# Patient Record
Sex: FEMALE | Race: OTHER
[De-identification: names, ages, dates, MRNs, and addresses within clinical notes are randomized per-mention and may not be internally consistent; named-entity substitution may affect disease eponyms.]

---

## 2019-04-01 ENCOUNTER — HOSPITAL ENCOUNTER (EMERGENCY)
Dept: HOSPITAL 62 - ER | Age: 49
LOS: 1 days | Discharge: HOME | End: 2019-04-02
Payer: OTHER GOVERNMENT

## 2019-04-01 DIAGNOSIS — N93.8: ICD-10-CM

## 2019-04-01 DIAGNOSIS — R11.0: ICD-10-CM

## 2019-04-01 DIAGNOSIS — D25.9: ICD-10-CM

## 2019-04-01 DIAGNOSIS — R10.30: Primary | ICD-10-CM

## 2019-04-01 DIAGNOSIS — R14.0: ICD-10-CM

## 2019-04-01 PROCEDURE — 74177 CT ABD & PELVIS W/CONTRAST: CPT

## 2019-04-01 PROCEDURE — 87210 SMEAR WET MOUNT SALINE/INK: CPT

## 2019-04-01 PROCEDURE — 87491 CHLMYD TRACH DNA AMP PROBE: CPT

## 2019-04-01 PROCEDURE — 99284 EMERGENCY DEPT VISIT MOD MDM: CPT

## 2019-04-01 PROCEDURE — 80053 COMPREHEN METABOLIC PANEL: CPT

## 2019-04-01 PROCEDURE — 96374 THER/PROPH/DIAG INJ IV PUSH: CPT

## 2019-04-01 PROCEDURE — 96372 THER/PROPH/DIAG INJ SC/IM: CPT

## 2019-04-01 PROCEDURE — 76830 TRANSVAGINAL US NON-OB: CPT

## 2019-04-01 PROCEDURE — 96375 TX/PRO/DX INJ NEW DRUG ADDON: CPT

## 2019-04-01 PROCEDURE — 87591 N.GONORRHOEAE DNA AMP PROB: CPT

## 2019-04-01 PROCEDURE — 81025 URINE PREGNANCY TEST: CPT

## 2019-04-01 PROCEDURE — 81001 URINALYSIS AUTO W/SCOPE: CPT

## 2019-04-01 PROCEDURE — 85025 COMPLETE CBC W/AUTO DIFF WBC: CPT

## 2019-04-01 PROCEDURE — 93976 VASCULAR STUDY: CPT

## 2019-04-01 PROCEDURE — 36415 COLL VENOUS BLD VENIPUNCTURE: CPT

## 2019-04-02 VITALS — DIASTOLIC BLOOD PRESSURE: 68 MMHG | SYSTOLIC BLOOD PRESSURE: 119 MMHG

## 2019-04-02 LAB
ADD MANUAL DIFF: NO
ALBUMIN SERPL-MCNC: 3.9 G/DL (ref 3.5–5)
ALP SERPL-CCNC: 87 U/L (ref 38–126)
ALT SERPL-CCNC: 21 U/L (ref 9–52)
ANION GAP SERPL CALC-SCNC: 7 MMOL/L (ref 5–19)
APPEARANCE UR: (no result)
APTT PPP: YELLOW S
AST SERPL-CCNC: 18 U/L (ref 14–36)
BACTERIA (WET MOUNT): (no result)
BASOPHILS # BLD AUTO: 0 10^3/UL (ref 0–0.2)
BASOPHILS NFR BLD AUTO: 0.5 % (ref 0–2)
BILIRUB DIRECT SERPL-MCNC: 0.2 MG/DL (ref 0–0.4)
BILIRUB SERPL-MCNC: 0.7 MG/DL (ref 0.2–1.3)
BILIRUB UR QL STRIP: NEGATIVE
BUN SERPL-MCNC: 12 MG/DL (ref 7–20)
CALCIUM: 9.3 MG/DL (ref 8.4–10.2)
CHLAM PCR: NOT DETECTED
CHLORIDE SERPL-SCNC: 109 MMOL/L (ref 98–107)
CO2 SERPL-SCNC: 25 MMOL/L (ref 22–30)
EOSINOPHIL # BLD AUTO: 0.1 10^3/UL (ref 0–0.6)
EOSINOPHIL NFR BLD AUTO: 1.1 % (ref 0–6)
EPITHELIALS (WET MOUNT): (no result)
ERYTHROCYTE [DISTWIDTH] IN BLOOD BY AUTOMATED COUNT: 13.9 % (ref 11.5–14)
GLUCOSE SERPL-MCNC: 99 MG/DL (ref 75–110)
GLUCOSE UR STRIP-MCNC: NEGATIVE MG/DL
GON PCR: NOT DETECTED
HCT VFR BLD CALC: 35.7 % (ref 36–47)
HGB BLD-MCNC: 12.2 G/DL (ref 12–15.5)
KETONES UR STRIP-MCNC: NEGATIVE MG/DL
LYMPHOCYTES # BLD AUTO: 3 10^3/UL (ref 0.5–4.7)
LYMPHOCYTES NFR BLD AUTO: 46.7 % (ref 13–45)
MCH RBC QN AUTO: 29.4 PG (ref 27–33.4)
MCHC RBC AUTO-ENTMCNC: 34.2 G/DL (ref 32–36)
MCV RBC AUTO: 86 FL (ref 80–97)
MONOCYTES # BLD AUTO: 0.4 10^3/UL (ref 0.1–1.4)
MONOCYTES NFR BLD AUTO: 5.6 % (ref 3–13)
NEUTROPHILS # BLD AUTO: 2.9 10^3/UL (ref 1.7–8.2)
NEUTS SEG NFR BLD AUTO: 46.1 % (ref 42–78)
NITRITE UR QL STRIP: NEGATIVE
PH UR STRIP: 6 [PH] (ref 5–9)
PLATELET # BLD: 268 10^3/UL (ref 150–450)
POTASSIUM SERPL-SCNC: 3.4 MMOL/L (ref 3.6–5)
PROT SERPL-MCNC: 6.6 G/DL (ref 6.3–8.2)
PROT UR STRIP-MCNC: NEGATIVE MG/DL
RBC # BLD AUTO: 4.15 10^6/UL (ref 3.72–5.28)
RBCS (WET MOUNT): (no result)
SODIUM SERPL-SCNC: 140.5 MMOL/L (ref 137–145)
SP GR UR STRIP: 1.03
T.VAGINALIS (WET MOUNT): (no result)
TOTAL CELLS COUNTED % (AUTO): 100 %
UROBILINOGEN UR-MCNC: NEGATIVE MG/DL (ref ?–2)
WBC # BLD AUTO: 6.4 10^3/UL (ref 4–10.5)
WBCS (WET MOUNT): (no result)
YEAST (WET MOUNT): (no result)

## 2019-04-02 NOTE — ER DOCUMENT REPORT
ED General





- General


Chief Complaint: Abdominal Pain


Stated Complaint: ABDOMINAL PAIN


Time Seen by Provider: 04/01/19 23:52


Primary Care Provider: 


NUSRAT BORDEN PA-C [Primary Care Provider] - Follow up tomorrow


Notes: 





Patient is a 48-year-old female with no chronic medical problems, no prior 

abdominal surgical history, presents with 2-3 days of lower abdominal discomfort

and bloating that became much worse in the last 12 hours.  Patient describes the

pain as being a severe, cramping, bloating discomfort.  She states that the pain

has been gradually worsening although became abruptly much worse when getting on

a flight from [place] some back to home today.  Nothing seems to improve the 

discomfort and she has tried ibuprofen.  Nothing worsens the discomfort.  The 

patient denies fever or constitutional symptoms.  Has had vaginal bleeding but 

denies vaginal discharge or dysuria.  Since her last cycle was over 3 months ago

which is highly atypical for her and this is the first cycle she has had since 

that time.  She has not seen her general physician or OB/GYN regarding today's 

concerns.  She denies a history of similar symptoms in the past.


TRAVEL OUTSIDE OF THE [place] IN LAST 30 DAYS: No





- Related Data


Allergies/Adverse Reactions: 


                                        





No Known Allergies Allergy (Unverified 04/01/19 21:53)


   











Past Medical History





- General


Information source: Patient





- Social History


Smoking Status: Never Smoker


Frequency of alcohol use: None


Drug Abuse: None


Lives with: Spouse/Significant other


Family History: Reviewed & Not Pertinent


Patient has suicidal ideation: No


Patient has homicidal ideation: No


Renal/ Medical History: Denies: Hx Peritoneal Dialysis





Review of Systems





- Review of Systems


Notes: 





Constitutional: Negative for fever.


HENT: Negative for sore throat.


Eyes: Negative for visual changes.


Cardiovascular: Negative for chest pain.


Respiratory: Negative for shortness of breath.


Gastrointestinal: Positive for bloating and lower abdominal pain.  Positive for 

nausea.


Genitourinary: Positive for vaginal bleeding


Musculoskeletal: Negative for back pain.


Skin: Negative for rash.


Neurological: Negative for headaches, weakness or numbness.





10 point ROS negative except as marked above and in HPI.





Physical Exam





- Vital signs


Vitals: 


                                        











Temp Pulse Resp BP Pulse Ox


 


 98.4 F   68   16   178/82 H  98 


 


 04/01/19 22:25  04/01/19 22:25  04/01/19 22:25  04/01/19 22:25  04/01/19 22:25











Interpretation: Hypertensive


Notes: 





PHYSICAL EXAMINATION:





GENERAL: Well-appearing, well-nourished and in no acute distress.





HEAD: Atraumatic, normocephalic.





EYES: Pupils equal round and reactive to light, extraocular movements intact, 

sclera anicteric, conjunctiva are normal.





ENT: nares patent, oropharynx clear without exudates.  Moist mucous membranes.





NECK: Normal range of motion, supple without lymphadenopathy





LUNGS: Breath sounds clear to auscultation bilaterally and equal.  No wheezes 

rales or rhonchi.





HEART: Regular rate and rhythm without murmurs





ABDOMEN: Soft, mildly bloated abdomen, mild suprapubic abdominal tenderness but 

no other areas of localized tenderness, normoactive bowel sounds.  No guarding, 

no rebound.  No masses appreciated.





: Scant bleeding from the cervical office.  No cervical lesions.  No cervical 

motion tenderness.  No adnexal tenderness.  Mild subpubic tenderness to 

palpation.





EXTREMITIES: Normal range of motion, no pitting or edema.  No cyanosis.





NEUROLOGICAL: No focal neurological deficits. Moves all extremities spont

aneously and on command.





PSYCH: Normal mood, normal affect.





SKIN: Warm, Dry, normal turgor, no rashes or lesions noted.





Course





- Re-evaluation


Re-evalutation: 





04/02/19 06:42


Presentation of a well-appearing 48-year-old female with abdominal bleeding and 

lower abdominal cramping that is been ongoing for the past 2 days much worse in 

the last 12 hours.  Abdominal exam is benign without any focal tenderness.  

Vitals are normal at the time of arrival.  Laboratories are unremarkable without

evidence of cystitis, pregnancy, or leukocytosis.    Based on clinical history 

and examination I do not suspect an acute appendicitis, tubo-ovarian abscess, 

pregnancy related pathology, pelvic inflammatory disease, mesenteric ischemia, 

or pyelonephritis.  Pelvic exam without cervical motion tenderness or focal 

adnexal tenderness.  CT scan of the abdomen pelvis likewise unremarkable.  

Transvaginal ultrasound likewise normal with the exception of 2 fibroids which 

could be worsening the patient's pain.  At this time will discharge with return 

precautions and follow-up recommendations.  Verbal discharge instructions given 

a the bedside and opportunity for questions given. Medication warnings reviewed.

Patient is in agreement with this plan and has verbalized understanding of 

return precautions and the need for primary care follow-up in the next 24-72 

hours.


04/02/19 07:27








- Vital Signs


Vital signs: 


                                        











Temp Pulse Resp BP Pulse Ox


 


 98.4 F   68   16   178/82 H  97 


 


 04/01/19 22:25  04/01/19 22:25  04/02/19 07:00  04/01/19 22:25  04/02/19 07:00














- Laboratory


Result Diagrams: 


                                 04/02/19 01:14





                                 04/02/19 01:58


Laboratory results interpreted by me: 


                                        











  04/02/19 04/02/19 04/02/19





  01:14 01:58 03:11


 


Hct  35.7 L  


 


Lymphocytes %  46.7 H  


 


Potassium   3.4 L 


 


Chloride   109 H 


 


Urine Blood    LARGE H














- Diagnostic Test


Radiology reviewed: Reports reviewed





Discharge





- Discharge


Clinical Impression: 


 Lower abdominal pain, Abdominal bloating associated with menstruation, Vaginal 

bleeding





Uterine fibroid


Qualifiers:


 Uterine leiomyoma location: unspecified location Qualified Code(s): D25.9 - 

Leiomyoma of uterus, unspecified





Condition: Good


Disposition: HOME, SELF-CARE


Additional Instructions: 


You have been seen in the Emergency Department (ED) for abdominal pain.  Your 

evaluation did not identify a clear cause of your symptoms but was generally 

reassuring.  Your CT scan, labs, ultrasound and pelvic exam are all reassuring. 

I do suspect that your symptoms are related to your menstrual cycle particular 

given that the cycle was delayed.





For your pain: Take ibuprofen 600 mg and acetaminophen 1000 mg every 6 hours 

together as needed for pain. 





Please follow up with your doctor as soon as possible regarding today's emergent

visit and the symptoms that are bothering you.





Return to the ED if your abdominal pain worsens or fails to improve, you develop

bloody vomiting, bloody diarrhea, you are unable to tolerate fluids due to 

vomiting, fever greater than 101, or other symptoms that concern you.


Prescriptions: 


Tramadol HCl [Ultram 50 mg Tablet] 50 mg PO Q6HP PRN #10 tablet


 PRN Reason: 


Referrals: 


NUSRAT BORDEN PA-C [Primary Care Provider] - Follow up tomorrow


BREE LUCAS MD [ACTIVE STAFF] - Follow up in 3-5 days

## 2019-04-02 NOTE — RADIOLOGY REPORT (SQ)
EXAM DESCRIPTION: 



US TRANSVAGINAL



COMPLETED DATE/TME:  04/02/2019 04:29



CLINICAL HISTORY: 48 years Female, lower pelvic pain, bleeding.

LMP 3/29/2019



COMPARISON: None.



TECHNIQUE: Complete pelvic ultrasound with transvaginal imaging.



FINDINGS:



Uterus: Uterus measures 7.4 x 6.4 x 5.9. Heterogeneous hypodense

structures within the uterine myometrium. There is an posterior

intramural structure measuring 1.5 cm in greatest dimension.

There is a posterior subserosal structure measuring 2.6 cm in

greatest dimension. Cervix measures 3.3 cm. Nabothian cysts.

Endometrium: Endometrial thickness of 0.3 cm.



Right ovary: Right ovary measures 3.0 x 1.2 x 1.9 cm.

Left ovary: Left ovary measures 2.7 x 1.4 x 1.9 cm.

Adnexa: No large adnexal masses.



Free fluid: No free pelvic fluid.



Duplex imaging: Color and spectral Doppler imaging of the ovaries

demonstrates flow bilaterally.



IMPRESSION:



1. There are 2 fibroids within the uterus, the largest is a

subserosal posterior fibroid measuring 2.6 cm in greatest

dimension.



2. No sonographic abnormality identified in the pelvis.

## 2019-04-02 NOTE — RADIOLOGY REPORT (SQ)
EXAM DESCRIPTION: 



CT ABDOMEN PELVIS WITH IV CONTRAST



COMPLETED DATE/TME:  04/02/2019 00:05



CLINICAL HISTORY: 



48 years, Female, severe abd pain



COMPARISON:

None.



TECHNIQUE:

397  Images stored on PACS.

 

All CT scanners at this facility use dose modulation, iterative

reconstruction, and/or weight based dosing when appropriate to

reduce radiation dose to as low as reasonably achievable (ALARA).





[facility]: Dose Right [facility]: CareDose   [facility]: Dose Right    [facility]:

Teradose 4D    [facility]: Smart Technologies



LIMITATIONS:

None.



FINDINGS:



Limited evaluation of the lung bases is unremarkable. Osseous

structures are grossly intact. Fatty infiltrative change to the

liver. The spleen, adrenal glands, pancreas, kidneys are

unremarkable. The gallbladder is present. Normal appendix. No

gross evidence for bowel obstruction. No free air or free fluid.





IMPRESSION:



Negative for acute intra-abdominal/pelvic process

 

TECHNICAL DOCUMENTATION:



Quality ID # 436: Final reports with documentation of one or more

dose reduction techniques (e.g., Automated exposure control,

adjustment of the mA and/or kV according to patient size, use of

iterative reconstruction technique)



copyright 2011 [employer]- All Rights Reserved

## 2019-04-02 NOTE — ER DOCUMENT REPORT
ED Medical Screen (RME)





- General


Chief Complaint: Abdominal Pain


Stated Complaint: ABDOMINAL PAIN


Time Seen by Provider: 04/01/19 23:52


Notes: 





48F with no past medical history presents to the emergency department for severe

periumbilical abdominal pain since 230 this afternoon.  She was boarding a 

flight leaving [place] when the pain started but she stated that she wanted to 

get home before she got seen.  She states the pain is constant and unremitting 

rated 10/10.  She states she is also very bloated and her  notes it is 

gotten worse since they boarded the flight.  She denies fevers, chills, nausea, 

vomiting, diarrhea.


TRAVEL OUTSIDE OF THE [place] IN LAST 30 DAYS: No





- Related Data


Allergies/Adverse Reactions: 


                                        





No Known Allergies Allergy (Unverified 04/01/19 21:53)


   











Past Medical History


Renal/ Medical History: Denies: Hx Peritoneal Dialysis





Physical Exam





- Vital signs


Vitals: 





                                        











Temp Pulse Resp BP Pulse Ox


 


 98.4 F   68   16   178/82 H  98 


 


 04/01/19 22:25  04/01/19 22:25  04/01/19 22:25  04/01/19 22:25  04/01/19 22:25














- Respiratory


Respiratory status: No respiratory distress


Breath sounds: Normal





- Cardiovascular


Rhythm: Regular


Heart sounds: Normal auscultation, S1 appreciated, S2 appreciated


Murmur: No





Course





- Vital Signs


Vital signs: 





                                        











Temp Pulse Resp BP Pulse Ox


 


 98.4 F   68   16   178/82 H  98 


 


 04/01/19 22:25  04/01/19 22:25  04/01/19 22:25  04/01/19 22:25  04/01/19 22:25

## 2020-04-30 ENCOUNTER — HOSPITAL ENCOUNTER (OUTPATIENT)
Dept: HOSPITAL 62 - RDC | Age: 50
End: 2020-04-30
Attending: NURSE PRACTITIONER
Payer: COMMERCIAL

## 2020-04-30 VITALS — DIASTOLIC BLOOD PRESSURE: 86 MMHG | SYSTOLIC BLOOD PRESSURE: 130 MMHG

## 2020-04-30 DIAGNOSIS — R05: ICD-10-CM

## 2020-04-30 DIAGNOSIS — R09.89: ICD-10-CM

## 2020-04-30 DIAGNOSIS — R51: ICD-10-CM

## 2020-04-30 DIAGNOSIS — M79.10: ICD-10-CM

## 2020-04-30 DIAGNOSIS — Z20.828: Primary | ICD-10-CM

## 2020-04-30 DIAGNOSIS — R06.02: ICD-10-CM

## 2020-04-30 DIAGNOSIS — J02.9: ICD-10-CM

## 2020-04-30 DIAGNOSIS — R10.9: ICD-10-CM

## 2020-04-30 DIAGNOSIS — R50.9: ICD-10-CM

## 2020-04-30 LAB
A TYPE INFLUENZA AG: NEGATIVE
B INFLUENZA AG: NEGATIVE

## 2020-04-30 PROCEDURE — 87070 CULTURE OTHR SPECIMN AEROBIC: CPT

## 2020-04-30 PROCEDURE — 87880 STREP A ASSAY W/OPTIC: CPT

## 2020-04-30 PROCEDURE — 99211 OFF/OP EST MAY X REQ PHY/QHP: CPT

## 2020-04-30 PROCEDURE — 87635 SARS-COV-2 COVID-19 AMP PRB: CPT

## 2020-04-30 PROCEDURE — 87804 INFLUENZA ASSAY W/OPTIC: CPT

## 2020-04-30 PROCEDURE — 99201: CPT

## 2020-04-30 NOTE — ER RDC ASSESSMENT REPORT
Intake





- In the Last 14 days


Have you traveled outside [place]?: No


Have you been in close contact with someone CONFIRMED: No


Worked in Healthcare?: No





- Symptoms


Subjective Fever(Felt feverish): Yes


Chills: Yes


Muscule Aches: Yes


Runny Nose: Yes


Sore Throat: Yes


Cough (New or worsening chronic cough): Yes


--How many day(s)?: RePorts a dry cough has been treated for bronchitis


Shortness of breath: Yes


Nausea or Vomiting: No


Headache: Yes


Abdominal Pain: Yes


Diarrhea(3 or more loose stools in last 24 hours): No





- Do you have any of the following


Chronic lung disease: Asthma or emphysema or COPD: No


Cystic Fibrosis: No


Diabetes: No


High Blood Pressure: No


Cardiovascular Disease: No


Chronic Kidney Disease: No


Chronic Liver Disease: No


Chronic blood disorder like Sickle Cell Disease: No


Weak immune system due to disease or medication: No


Neurologic condition that limits movement: No


Developmental delay - Moderate to Severe: No


Recent (within past 2 weeks) or current Pregnancy: No


Morbid Obesity (>100 pounds over ideal weight): No


Obesity Comment: 





Height 5 feet 3 inches weight 161 pounds





- Objective


Temperature: 96.2 F


Pulse Rate: 86


Respiratory Rate: 20


Blood Pressure: 130/86


O2 Sat by Pulse Oximetry: 98


Objective: 


Given above, testing performed: 
































If Testing Performed:


Test Specimen Type Sent to











General





- General


Information source: Patient


Notes: 





Patient here for Covid testing. Has been feeling sick since April 7, 2020 just 

completed Antibiotics for Bronchitis. Was also given an inhaler. Reports has had

fever on and off. Continues to have the same symptoms with cough not improved.  

Will be following up with PCP today.





- Related Data


Allergies/Adverse Reactions: 


                                        





No Known Allergies Allergy (Unverified 04/01/19 21:53)


   











Past Medical History





- Social History


Smoking Status: Never Smoker


Family History: Reviewed & Not Pertinent


Renal/ Medical History: Denies: Hx Peritoneal Dialysis





Physical Exam





- General


General appearance: Appears well, Alert


In distress: None


Notes: 





PHYSICAL EXAMINATION: 


GENERAL: Well-appearing and in no acute distress. 


HEAD: Atraumatic, normocephalic. 


EYES: sclera anicteric, conjunctiva are normal. 


ENT: nares patent. Moist mucous membranes. 


NECK: Normal range of motion, supple without lymphadenopathy slight right 

anterior lymphadenopothy tender bilateral. States throat sore both sides.


LUNGS: CTAB and equal. No wheezes rales or rhonchi. Lung sounds clear resp even 

and unlabored.


HEART: Regular rate and rhythm without murmurs 


ABDOMEN: Soft, nontender, normal bowel sounds, no guarding. 


EXTREMITIES: No cyanosis. 


NEUROLOGICAL:  Normal speech. 


PSYCH: Normal mood, normal affect. 


SKIN: Warm, Dry, normal turgor, 








- Respiratory


Breath sounds: Normal





Diagnostic Results


Laboratory Results: 


 Patient informed of negative rapid strep and negative rapid flu. Pending strep 

culture pending covid testing results. Provided instructions on COVID to 

include: As a person under investigation for Covid 19, the [facility] of Health and Human Services, division of public health advises you 

to adhere to the following guidance until your test results are reported to you.

 If your test result is positive, you will receive additional information from 

your provider and your local health department at that time.


 


Remain at home until you are cleared by the health provider or public health 

authorities.


 


Keep a log of visitors to your home, notify any visitors to your home of your 

isolation status.


 


If you plan to move to a new address or leave the county, notify the local 

health department in your County.


 


Call your doctor or seek care if you have an urgent medical need.  Before 

seeking medical care, call ahead to get instructions from the provider before 

arriving at the medical office clinic or hospital.  Notify them that you are 

being tested for the virus that causes Covid 19 so that arrangements can be 

made, as necessary, to prevent transmission to others in the healthcare setting.

 Next, notify the local health department in your county.


 


If a medical emergency arises and you need to call 911, inform the first 

responders that you are being tested for the virus that causes Covid 19.  Next, 

notify the local health department in your county.





Patient Education/Counseling


Counseling/Education: 





Patient presents with upper respiratory symptoms worrisome for possible Covid 

19.  Patient does not have emergency worring symptoms such as difficulty 

breathing, shortness of breath, chest pain, pressure, confusion or cyanosis.  

Patient appears suitable for discharge. Patient scheduled to follow up with PCP 

today.  Instructed to go to ED for persistent or worsening symptoms.  Patient's 

vital signs are stable and patient is nontoxic in appearance.  Good return 

precautions have been discussed with patient, patient verbalized understanding 

and is agreeable with discharge plan of care at this time.





C Discharge





- Discharge


Clinical Impression: 


 COVID --19 SCREENING





Condition: Stable


Disposition: Home; Selfcare

## 2020-05-08 ENCOUNTER — HOSPITAL ENCOUNTER (OUTPATIENT)
Dept: HOSPITAL 62 - RAD | Age: 50
End: 2020-05-08
Attending: PHYSICIAN ASSISTANT
Payer: COMMERCIAL

## 2020-05-08 DIAGNOSIS — R05: Primary | ICD-10-CM

## 2020-05-08 PROCEDURE — 71046 X-RAY EXAM CHEST 2 VIEWS: CPT

## 2020-05-08 NOTE — RADIOLOGY REPORT (SQ)
EXAM DESCRIPTION:  CHEST 2 VIEWS



IMAGES COMPLETED DATE/TIME:  5/8/2020 10:21 am



REASON FOR STUDY:  R05 COUGH



COMPARISON:  None.



EXAM PARAMETERS:  NUMBER OF VIEWS: two views

TECHNIQUE: Digital Frontal and Lateral radiographic views of the chest acquired.

RADIATION DOSE: NA

LIMITATIONS: none



FINDINGS:  LUNGS AND PLEURA: No opacities, masses or pneumothorax. No pleural effusion.

MEDIASTINUM AND HILAR STRUCTURES: No masses or contour abnormalities.

HEART AND VASCULAR STRUCTURES: Heart normal size.  No evidence for failure.

BONES: No acute findings.

HARDWARE: None in the chest.

OTHER: No other significant finding.



IMPRESSION:  NO ACUTE RADIOGRAPHIC FINDING IN THE CHEST.



TECHNICAL DOCUMENTATION:  JOB ID:  [id number]

 2011 [employer]- All Rights Reserved



Reading location - IP/workstation name: [email or web address]

## 2020-06-23 ENCOUNTER — HOSPITAL ENCOUNTER (EMERGENCY)
Dept: HOSPITAL 62 - ER | Age: 50
Discharge: HOME | End: 2020-06-23
Payer: COMMERCIAL

## 2020-06-23 VITALS — SYSTOLIC BLOOD PRESSURE: 132 MMHG | DIASTOLIC BLOOD PRESSURE: 72 MMHG

## 2020-06-23 DIAGNOSIS — M54.5: ICD-10-CM

## 2020-06-23 DIAGNOSIS — R31.9: ICD-10-CM

## 2020-06-23 DIAGNOSIS — D25.9: Primary | ICD-10-CM

## 2020-06-23 LAB
ADD MANUAL DIFF: NO
ALBUMIN SERPL-MCNC: 4.3 G/DL (ref 3.5–5)
ALP SERPL-CCNC: 102 U/L (ref 38–126)
ANION GAP SERPL CALC-SCNC: 3 MMOL/L (ref 5–19)
APPEARANCE UR: CLEAR
APTT PPP: (no result) S
AST SERPL-CCNC: 29 U/L (ref 14–36)
BASOPHILS # BLD AUTO: 0 10^3/UL (ref 0–0.2)
BASOPHILS NFR BLD AUTO: 0.3 % (ref 0–2)
BILIRUB DIRECT SERPL-MCNC: 0 MG/DL (ref 0–0.4)
BILIRUB SERPL-MCNC: 0.4 MG/DL (ref 0.2–1.3)
BILIRUB UR QL STRIP: NEGATIVE
BUN SERPL-MCNC: 10 MG/DL (ref 7–20)
CALCIUM: 9.1 MG/DL (ref 8.4–10.2)
CHLORIDE SERPL-SCNC: 107 MMOL/L (ref 98–107)
CO2 SERPL-SCNC: 28 MMOL/L (ref 22–30)
EOSINOPHIL # BLD AUTO: 0 10^3/UL (ref 0–0.6)
EOSINOPHIL NFR BLD AUTO: 0.8 % (ref 0–6)
ERYTHROCYTE [DISTWIDTH] IN BLOOD BY AUTOMATED COUNT: 13.8 % (ref 11.5–14)
GLUCOSE SERPL-MCNC: 96 MG/DL (ref 75–110)
GLUCOSE UR STRIP-MCNC: NEGATIVE MG/DL
HCT VFR BLD CALC: 37.3 % (ref 36–47)
HGB BLD-MCNC: 12.8 G/DL (ref 12–15.5)
KETONES UR STRIP-MCNC: NEGATIVE MG/DL
LYMPHOCYTES # BLD AUTO: 1.8 10^3/UL (ref 0.5–4.7)
LYMPHOCYTES NFR BLD AUTO: 33.7 % (ref 13–45)
MCH RBC QN AUTO: 29.9 PG (ref 27–33.4)
MCHC RBC AUTO-ENTMCNC: 34.2 G/DL (ref 32–36)
MCV RBC AUTO: 87 FL (ref 80–97)
MONOCYTES # BLD AUTO: 0.3 10^3/UL (ref 0.1–1.4)
MONOCYTES NFR BLD AUTO: 5.5 % (ref 3–13)
NEUTROPHILS # BLD AUTO: 3.2 10^3/UL (ref 1.7–8.2)
NEUTS SEG NFR BLD AUTO: 59.7 % (ref 42–78)
NITRITE UR QL STRIP: NEGATIVE
PH UR STRIP: 7 [PH] (ref 5–9)
PLATELET # BLD: 259 10^3/UL (ref 150–450)
POTASSIUM SERPL-SCNC: 4.2 MMOL/L (ref 3.6–5)
PROT SERPL-MCNC: 7.1 G/DL (ref 6.3–8.2)
PROT UR STRIP-MCNC: NEGATIVE MG/DL
RBC # BLD AUTO: 4.27 10^6/UL (ref 3.72–5.28)
SP GR UR STRIP: 1
TOTAL CELLS COUNTED % (AUTO): 100 %
UROBILINOGEN UR-MCNC: NEGATIVE MG/DL (ref ?–2)
WBC # BLD AUTO: 5.3 10^3/UL (ref 4–10.5)

## 2020-06-23 PROCEDURE — 76830 TRANSVAGINAL US NON-OB: CPT

## 2020-06-23 PROCEDURE — 81001 URINALYSIS AUTO W/SCOPE: CPT

## 2020-06-23 PROCEDURE — 96372 THER/PROPH/DIAG INJ SC/IM: CPT

## 2020-06-23 PROCEDURE — 99284 EMERGENCY DEPT VISIT MOD MDM: CPT

## 2020-06-23 PROCEDURE — 85025 COMPLETE CBC W/AUTO DIFF WBC: CPT

## 2020-06-23 PROCEDURE — 80053 COMPREHEN METABOLIC PANEL: CPT

## 2020-06-23 PROCEDURE — 74176 CT ABD & PELVIS W/O CONTRAST: CPT

## 2020-06-23 PROCEDURE — 36415 COLL VENOUS BLD VENIPUNCTURE: CPT

## 2020-06-23 NOTE — RADIOLOGY REPORT (SQ)
EXAM DESCRIPTION:  CT ABD/PELVIS NO ORAL OR IV



IMAGES COMPLETED DATE/TIME:  6/23/2020 11:04 am



REASON FOR STUDY:  low back pain/blood in urine



COMPARISON:  CT of the abdomen and pelvis with contrast from 4/2/2019.



TECHNIQUE:  CT scan of the abdomen and pelvis performed without intravenous or oral contrast. Images 
reviewed with lung, soft tissue, and bone windows. Reconstructed coronal and sagittal MPR images revi
ewed. All images stored on PACS.

All CT scanners at this facility use dose modulation, iterative reconstruction, and/or weight based d
osing when appropriate to reduce radiation dose to as low as reasonably achievable (ALARA).

[facility]: Dose Right  [facility]: CareDose    [facility]: Dose Right    [facility]: Teradose 4D    [facility]: Smart Technologies



RADIATION DOSE:  CT Rad equipment meets quality standard of care and radiation dose reduction techniq
ues were employed. CTDIvol: 8.7 mGy. DLP: 444 mGy-cm.



LIMITATIONS:  None.



FINDINGS:  LOWER CHEST: No acute findings.

NON-CONTRASTED LIVER, SPLEEN, ADRENALS: Evaluation is limited due to the absence of intravenous contr
ast.  The relative hypoattenuation of the hepatic parenchyma is suggestive of underlying hepatic stea
tosis.  The spleen is normal in size.  There is no adrenal mass.

PANCREAS: No acute gross abnormality of the pancreas.

GALLBLADDER: No abnormality that is apparent on CT.

RIGHT KIDNEY AND URETER: Evaluation is limited due to the absence of intravenous contrast.  There is 
no hydronephrosis, nephrolithiasis, hydroureter or ureterolithiasis.

LEFT KIDNEY AND URETER: Evaluation is limited due to the absence of intravenous contrast.  There is n
o hydronephrosis, nephrolithiasis, hydroureter or ureterolithiasis.

AORTA AND RETROPERITONEUM: No aneurysm of the abdominal aorta.  No retroperitoneal adenopathy, hemorr
crystal or mass.

BOWEL AND PERITONEAL CAVITY: No bowel obstruction, bowel wall thickening or pericolonic/ perienteric 
inflammation.  No mesenteric adenopathy, free intraperitoneal fluid or mesenteric/ omental inflammati
on.

APPENDIX: Normal.

PELVIS, BLADDER, AND ABDOMINAL WALL:No urinary bladder calculus.  There is no abnormality of the uter
us or adnexa that is apparent on CT.  There is no abdominal wall mass or hernia.

BONES: No acute findings.

OTHER: No other finding.



IMPRESSION:  No acute intra-abdominal abnormality.



COMMENT:  Quality ID # 436: Final reports with documentation of one or more dose reduction techniques
 (e.g., Automated exposure control, adjustment of the mA and/or kV according to patient size, use of 
iterative reconstruction technique)



TECHNICAL DOCUMENTATION:  JOB ID:  [id number]

 2011 [employer]- All Rights Reserved



Reading location - IP/workstation name: [facility]

## 2020-06-23 NOTE — ER DOCUMENT REPORT
ED General





- General


Chief Complaint: Back Pain


Stated Complaint: BACK PAIN,BLOOD IN URINE


Time Seen by Provider: 06/23/20 10:27


Primary Care Provider: 


NUSRAT BORDEN PA-C [Primary Care Provider] - Follow up as needed


Mode of Arrival: Ambulatory


Information source: Patient


TRAVEL OUTSIDE OF THE [place] IN LAST 30 DAYS: No





- HPI


Notes: 





Patient is sent from urgent care with low back pain.  She states she is had this

pain for a little over a week.  It is bilateral across the lower back.  Nothing 

makes it better or worse.  It has steadily become worse over the last week 

however.  She denies any heavy lifting or new exercise programs.  No known 

trauma.  She has had no fever sweats or chills.  No cough cold or congestion.  

No rashes.  She has had no abdominal pain but she has had some lower pelvic pain

and some mild dysuria.  She has not appreciated any vaginal symptoms.  No blood 

in the urine.  She states she does have a history of uterine fibroids.  The pain

is been an aching sensation and is moderate in intensity.  It does radiate 

across the lower back.





- Related Data


Allergies/Adverse Reactions: 


                                        





No Known Allergies Allergy (Unverified 04/01/19 21:53)


   











Past Medical History





- General


Information source: Patient





- Social History


Smoking Status: Never Smoker


Frequency of alcohol use: Social


Drug Abuse: None


Family History: Reviewed & Not Pertinent


Renal/ Medical History: Denies: Hx Peritoneal Dialysis





Review of Systems





- Review of Systems


Constitutional: denies: Chills, Fever


Cardiovascular: denies: Chest pain, Palpitations


Respiratory: denies: Cough, Short of breath


-: Yes All other systems reviewed and negative





Physical Exam





- Vital signs


Vitals: 





                                        











Temp Pulse Resp BP Pulse Ox


 


 99.1 F   74   20   120/88 H  99 


 


 06/23/20 09:34  06/23/20 09:34  06/23/20 09:34  06/23/20 09:34  06/23/20 09:34











Interpretation: Normal





- General


General appearance: Appears well, Alert





- HEENT


Head: Normocephalic, Atraumatic


Eyes: Normal


Pupils: PERRL





- Respiratory


Respiratory status: No respiratory distress


Chest status: Nontender


Breath sounds: Normal


Chest palpation: Normal





- Cardiovascular


Rhythm: Regular


Heart sounds: Normal auscultation


Murmur: No





- Abdominal


Inspection: Normal


Distension: No distension


Bowel sounds: Normal


Tenderness: Nontender


Organomegaly: No organomegaly





- Back


Back: Normal -   Inspection of the area is unremarkable., Tender - Some minimal 

tenderness palpation of the lumbar area paraspinally bilaterally.





- Extremities


General upper extremity: Normal inspection, Nontender, Normal color, Normal ROM,

Normal temperature


General lower extremity: Normal inspection, Nontender, Normal color, Normal ROM,

Normal temperature, Normal weight bearing.  No: Shar's sign





- Neurological


Neuro grossly intact: Yes


Cognition: Normal


Orientation: AAOx4


Procious Coma Scale Eye Opening: Spontaneous


Procious Coma Scale Verbal: Oriented


Chelle Coma Scale Motor: Obeys Commands


Procious Coma Scale Total: 15


Speech: Normal


Motor strength normal: LUE, RUE, LLE, RLE


Sensory: Normal





- Psychological


Associated symptoms: Normal affect, Normal mood





- Skin


Skin Temperature: Warm


Skin Moisture: Dry


Skin Color: Normal





Course





- Re-evaluation


Re-evalutation: 





06/23/20 14:02


CT of the abdomen and pelvis is unremarkable including the bony windows.  

Patient's ultrasound does show multiple fibroids coupled with the blood in the 

urine I think patient may be having pain secondary to her fibroid.  It does not 

appear to be consistent with a kidney stone since patient has bilateral pain.  

No other significant abnormality was appreciated.  Her vital signs are stable.





- Vital Signs


Vital signs: 





                                        











Temp Pulse Resp BP Pulse Ox


 


 99.1 F   73   20   120/88 H  100 


 


 06/23/20 09:37  06/23/20 09:37  06/23/20 09:37  06/23/20 09:37  06/23/20 09:37














- Laboratory


Result Diagrams: 


                                 06/23/20 11:15





                                 06/23/20 11:15


Laboratory results interpreted by me: 





                                        











  06/23/20 06/23/20





  09:42 11:15


 


Anion Gap   3 L


 


Urine Blood  MODERATE H 














- Diagnostic Test


Radiology reviewed: Image reviewed, Reports reviewed





Discharge





- Discharge


Clinical Impression: 


Uterine fibroid


Qualifiers:


 Uterine leiomyoma location: unspecified location Qualified Code(s): D25.9 - 

Leiomyoma of uterus, unspecified





Condition: Stable


Disposition: HOME, SELF-CARE


Instructions:  Low Back Pain ([facility]), Pain Medication Injection ([facility])


Additional Instructions: 


please call Ob/Gyn as soon as possible





Prescriptions: 


Hydrocodone/Acetaminophen [Norco 5-325 mg Tablet] 1 tab PO Q6 PRN 3 Days #12 

tablet


 PRN Reason: 


Forms:  Return to Work


Referrals: 


BREE LUCAS MD [ACTIVE STAFF] - Follow up in 3-5 days

## 2020-06-23 NOTE — RADIOLOGY REPORT (SQ)
EXAM DESCRIPTION:  U/S NON-OB PELVIS TV W/O DOP



IMAGES COMPLETED DATE/TIME:  6/23/2020 1:45 pm



REASON FOR STUDY:  pelvic/low back pain



COMPARISON:  Pelvic ultrasound from 4/2/2019.



TECHNIQUE:  Dynamic and static grayscale images acquired of the pelvis via transvaginal approach and 
recorded on PACS. Additional selected color Doppler and spectral images recorded.



LIMITATIONS:  None.



FINDINGS:  UTERUS: The uterus measures 8.6 x 5.2 x 4.3 cm.  The echotexture of the myometrium is hete
rogeneous and there are numerous uterine fibroids the largest of which measures 2.5 x 2.5 x 2.3 cm.

ENDOMETRIAL STRIPE: The endometrium measures 5 mm in thickness.

CERVIX: The cervix measures 2.9 cm in length.

RIGHT OVARY AND DOPPLER: The right ovary measures 2.9 x 2.1 x 2.3 cm and on Doppler there is intact a
rterial inflow and venous outflow within it.  There is no adnexal mass.

LEFT OVARY AND DOPPLER: The left kidney measures 2.3 x 1.7 x 1.5 cm and on Doppler there is intact ar
terial inflow and venous outflow within it.  There is no adnexal mass.

FREE FLUID: None noted.

OTHER: No other finding.



IMPRESSION:  1. Uterine fibroids.

2.  Normal endometrial thickness.

3. Normal appearance of the ovaries.



TECHNICAL DOCUMENTATION:  JOB ID:  [id number]

 2011 [employer]- All Rights Reserved                          Rev-[id number]



Reading location - IP/workstation name: [email or web address]